# Patient Record
Sex: FEMALE | Race: WHITE | Employment: OTHER | URBAN - METROPOLITAN AREA
[De-identification: names, ages, dates, MRNs, and addresses within clinical notes are randomized per-mention and may not be internally consistent; named-entity substitution may affect disease eponyms.]

---

## 2017-06-28 ENCOUNTER — HOSPITAL ENCOUNTER (OUTPATIENT)
Dept: PHYSICAL THERAPY | Age: 67
Discharge: HOME | End: 2017-06-28
Payer: MEDICARE

## 2017-06-28 PROCEDURE — 97110 THERAPEUTIC EXERCISES: CPT

## 2017-06-28 PROCEDURE — G8982 BODY POS GOAL STATUS: HCPCS

## 2017-06-28 PROCEDURE — 97162 PT EVAL MOD COMPLEX 30 MIN: CPT

## 2017-06-28 PROCEDURE — G8981 BODY POS CURRENT STATUS: HCPCS

## 2017-06-28 NOTE — PROGRESS NOTES
[address] PHYSICAL THERAPY    [address] [place] [address], Severa Hoard [address],[facility], [address] [facility]       Phone: [phone]  Fax: [phone] / [phone] [facility]  Patient Name: Benita Freitas : 1950   Medical   Diagnosis: R Knee pain Treatment Diagnosis: Right knee pain [M25.561]   Onset Date: 1 month ago     Referral Source: Dion Bond MD [place] [facility]): 2017   Prior Hospitalization: See medical history Provider #: [id number]   Prior Level of Function: No difficulty with walking, standing, kneeling, squatting   Comorbidities: Heart Disease, HTN, Type II Diabetes, Visually impaired, Hearing impaired, Asthma   Medications: Verified on Patient Summary List   The Plan of Care and following information is based on the information from the initial evaluation.   ===========================================================================================  Assessment / key information:  Patient is a 75year old female presenting to therapy with signs and symptoms consistent with R knee pain. Patient reports her symptoms began about 1 month ago and is unable to recall a specific event which may have brought this on. Patient did have an x-ray which showed degenerative changes in her knee. Patient reports increased difficulty with squatting, kneeling, walking and standing activities. Patient notes symptoms feel better with rest. Patient rates pain at worst 10/10 and 5/10 at best.   Objective Data: Inspection-Patient ambulates with decreased stance time on the R. Knee AROM: R 0-110, L 3-0-135. Strength Testing R knee flex 4/5 (P!), ext 4/5 (P!); R hip abd and ext 3+/5. L knee strength 5/5. Tenderness noted to R medial joint line, R medial and lateral gastroc head, R medial HS. FOTO: 39/100. Patient educated on diagnosis, prognosis, POC and HEP. Patient issued copy of HEP and denied additional questions.  Patient will benefit from skilled PT in order to address these impairments and functional limitations.   ===========================================================================================  Eval Complexity: History HIGH Complexity :3+ comorbidities / personal factors will impact the outcome/ POC ;  Examination  HIGH Complexity : 4+ Standardized tests and measures addressing body structure, function, activity limitation and / or participation in recreation ; Presentation MEDIUM Complexity : Evolving with changing characteristics ; Decision Making MEDIUM Complexity : FOTO score of 26-74; Overall Complexity MEDIUM  Problem List: pain affecting function, decrease ROM, decrease strength, impaired gait/ balance, decrease ADL/ functional abilitiies, decrease activity tolerance, decrease flexibility/ joint mobility and decrease transfer abilities   Treatment Plan may include any combination of the following: Therapeutic exercise, Therapeutic activities, Neuromuscular re-education, Physical agent/modality, Gait/balance training, Manual therapy, Patient education and Functional mobility training  Patient / Family readiness to learn indicated by: asking questions, trying to perform skills and interest  Persons(s) to be included in education: patient (P)  Barriers to Learning/Limitations: no  Measures taken:    Patient Goal (s): Reduce pain, improve walking ability, return to kneeling   Patient self reported health status: excellent  Rehabilitation Potential: good   Short Term Goals: To be accomplished in  3  weeks:  1. Patient will demonstrate independence with HEP for self management of symptoms. 2. Patient will report reduction in pain at worst to 4-5/10 in order to improve standing tolerance.  Long Term Goals: To be accomplished in  6  weeks:  1. Patient will improve FOTO to >/= 55/100 in order to improve quality of life. 2. Patient will improve R knee AROM to equal that of the L in order to improve walking tolerance.   3. Patient will report a reduction in pain at worst to 2-3/10 in order to improve tolerance to kneeling. Frequency / Duration:   Patient to be seen  2  times per week for 6  weeks:  Patient / Caregiver education and instruction: self care, activity modification and exercises  G-Codes (GP): [id number] Current  CL= 60-79%   Goal  CK= 40-59%. The severity rating is based on the FOTO Score  Therapist Signature: Disha Berkowitz PT Date: 7/08/0234   Certification Period: 6/28/17-9/21/17 Time: 11:35 AM   ===========================================================================================  I certify that the above Physical Therapy Services are being furnished while the patient is under my care. I agree with the treatment plan and certify that this therapy is necessary. Physician Signature:        Date:       Time:     Please sign and return to [facility] or you may fax the signed copy to [phone]. Thank you.

## 2017-06-28 NOTE — PROGRESS NOTES
PHYSICAL THERAPY - DAILY TREATMENT NOTE    Patient Name: Peace Farfan        Date: 2017  : 1950   YES Patient  Verified  Visit #:     Insurance: Payor: Janett Brandon / Plan: [place] MED[place] & [place] / Product Type: Medicare /      In time: 1100 Out time: 1130   Total Treatment Time: 30     Medicare Time Tracking (below)   Total Timed Codes (min):  10 1:1 Treatment Time:  10     TREATMENT AREA =  Right knee pain [M25.561]    SUBJECTIVE  Pain Level (on 0 to 10 scale):  5  / 10   Medication Changes/New allergies or changes in medical history, any new surgeries or procedures? NO    If yes, update Summary List   Subjective Functional Status/Changes:  []  No changes reported     See POC          OBJECTIVE      10 min Therapeutic Exercise:  [x]  See flow sheet   Rationale:      increase ROM and increase strength to improve the patients ability to perform walking activities. min Patient Education:  YES  Reviewed HEP   []  Progressed/Changed HEP based on: Other Objective/Functional Measures:    See POC     Post Treatment Pain Level (on 0 to 10) scale:   5  / 10     ASSESSMENT  Assessment/Changes in Function:     See POC     []  See Progress Note/Recertification   Patient will continue to benefit from skilled PT services to modify and progress therapeutic interventions, address functional mobility deficits, address ROM deficits, address strength deficits, analyze and address soft tissue restrictions, analyze and cue movement patterns, analyze and modify body mechanics/ergonomics, assess and modify postural abnormalities and address imbalance/dizziness to attain remaining goals.    Progress toward goals / Updated goals:    See POC     PLAN  [x]  Upgrade activities as tolerated YES Continue plan of care   []  Discharge due to :    []  Other:      Therapist: Iliana Boswell PT    Date: 2017 Time: 11:44 AM     Future Appointments  Date Time Provider Loyda Calero   7/3/2017 1:00 PM Perla Mathis, PTA [facility]   7/6/2017 9:30 AM Perla Mathis, PTA [facility]   7/13/2017 11:30 AM Disha Gravel, PT [facility]   7/14/2017 2:30 PM Disha Gravbriana, PT [facility]   7/17/2017 10:30 AM Disha Gravbriana, PT [facility]   7/20/2017 11:00 AM Disha Gravel, PT [facility]   7/24/2017 10:00 AM Disha Gravel, PT [facility]   7/26/2017 1:00 PM Disha Gravbriana, PT [facility]

## 2017-07-03 ENCOUNTER — HOSPITAL ENCOUNTER (OUTPATIENT)
Dept: PHYSICAL THERAPY | Age: 67
Discharge: HOME | End: 2017-07-03
Payer: MEDICARE

## 2017-07-03 PROCEDURE — 97140 MANUAL THERAPY 1/> REGIONS: CPT

## 2017-07-03 PROCEDURE — 97110 THERAPEUTIC EXERCISES: CPT

## 2017-07-03 NOTE — PROGRESS NOTES
PHYSICAL THERAPY - DAILY TREATMENT NOTE    Patient Name: Baldo Slider        Date: 7/3/2017  : 1950   YES Patient  Verified  Visit #:     Insurance: Payor: Jonh Padron / Plan: [place] MEDICARE PART A & B / Product Type: Medicare /      In time: 6638 Out time: 130   Total Treatment Time: 39     Medicare Time Tracking (below)   Total Timed Codes (min):  45 1:1 Treatment Time:  45     TREATMENT AREA =  Right knee pain [M25.561]    SUBJECTIVE  Pain Level (on 0 to 10 scale):  2-3  / 10   Medication Changes/New allergies or changes in medical history, any new surgeries or procedures? NO    If yes, update Summary List   Subjective Functional Status/Changes:  []  No changes reported     I have been doing my exercises that Barb Davis gave me. Reports no significant problems over the weekend. OBJECTIVE    35 min Therapeutic Exercise:  [x]  See flow sheet   Rationale:      increase ROM and increase strength to improve the patients ability to perform functional mobility activities with decrease c/o symptoms. 10 min Manual Therapy: Patella mobs, PROM, DTM VMO, gastroc, HS. Rationale:      decrease pain, increase ROM and increase tissue extensibility to improve patient's ability to perform functional mobility activities with decrease c/o symptoms. min Patient Education:  YES  Reviewed HEP   []  Progressed/Changed HEP based on: Other Objective/Functional Measures:    No change in functional measurements today.      Post Treatment Pain Level (on 0 to 10) scale:   2  / 10     ASSESSMENT  Assessment/Changes in Function:     Overall good tolerance to all therapeutic interventions for first treatment session     []  See Progress Note/Recertification   Patient will continue to benefit from skilled PT services to modify and progress therapeutic interventions, address functional mobility deficits, address ROM deficits, address strength deficits, analyze and address soft tissue restrictions and analyze and cue movement patterns to attain remaining goals. Progress toward goals / Updated goals:    No change toward goals today.      PLAN  []  Upgrade activities as tolerated YES Continue plan of care   []  Discharge due to :    []  Other:      Therapist: GARY Bass    Date: 7/3/2017 Time: 7:02 AM       Future Appointments  Date Time Provider Loyda Calero   7/3/2017 1:00 PM Scar Ahr, PTA [facility]   7/6/2017 9:30 AM Scar Ahr, PTA [facility]   7/13/2017 11:30 AM Roman Nunes, PT [facility]   7/14/2017 2:30 PM Roman Nunes, PT [facility]   7/17/2017 10:30 AM Roman Nunes, PT [facility]   7/20/2017 11:00 AM Roman Nunes, PT [facility]   7/24/2017 10:00 AM Roman Nunes, PT [facility]   7/26/2017 1:00 PM Roman Nunes, PT [facility]

## 2017-07-06 ENCOUNTER — HOSPITAL ENCOUNTER (OUTPATIENT)
Dept: PHYSICAL THERAPY | Age: 67
Discharge: HOME | End: 2017-07-06
Payer: MEDICARE

## 2017-07-06 PROCEDURE — 97110 THERAPEUTIC EXERCISES: CPT

## 2017-07-06 PROCEDURE — 97140 MANUAL THERAPY 1/> REGIONS: CPT

## 2017-07-06 NOTE — PROGRESS NOTES
PHYSICAL THERAPY - DAILY TREATMENT NOTE    Patient Name: Naif Nassar        Date: 2017  : 1950   YES Patient  Verified  Visit #:   3   of   12  Insurance: Payor: Renny Frankel / Plan: [place] MED[place] PART A & B / Product Type: Medicare /      In time: 925 Out time: 1010   Total Treatment Time: 39     Medicare Time Tracking (below)   Total Timed Codes (min):  45 1:1 Treatment Time:  45     TREATMENT AREA =  Right knee pain [M25.561]    SUBJECTIVE  Pain Level (on 0 to 10 scale):  1-2  / 10   Medication Changes/New allergies or changes in medical history, any new surgeries or procedures? NO    If yes, update Summary List   Subjective Functional Status/Changes:  []  No changes reported     [place] fine after last session. The knee hurt on the inside of my knee and it stil hurts, but I can raise it better. OBJECTIVE    35 min Therapeutic Exercise:  [x]  See flow sheet   Rationale:      increase ROM and increase strength to improve the patients ability to perform functional mobility activities with decrease c/o symptoms. 10 min Manual Therapy: Patella mobs, PROM, DTM VMO, gastroc, HS. Rationale:      decrease pain, increase ROM and increase tissue extensibility to improve patient's ability to perform functional mobility activities with decrease c/o symptoms. min Patient Education:  YES  Reviewed HEP   []  Progressed/Changed HEP based on: Other Objective/Functional Measures:    increasea knee ROM with PROM and heel slides. Post Treatment Pain Level (on 0 to 10) scale:   0-1  / 10     ASSESSMENT  Assessment/Changes in Function:     Decrease antlagic gait noted while ambulating in gym.      []  See Progress Note/Recertification   Patient will continue to benefit from skilled PT services to modify and progress therapeutic interventions, address functional mobility deficits, address ROM deficits, address strength deficits, analyze and address soft tissue restrictions and analyze and cue movement patterns to attain remaining goals. Progress toward goals / Updated goals:    Progressing steadily with pain reduction and performance of functional moiblity activities.      PLAN  []  Upgrade activities as tolerated YES Continue plan of care   []  Discharge due to :    []  Other:      Therapist: GARY Delgado    Date: 7/6/2017 Time: 6:27 AM       Future Appointments  Date Time Provider Loyda Calero   7/6/2017 9:30 AM Juan Perry, PTA [facility]   7/13/2017 11:30 AM Kady Sylvester, PT [facility]   7/14/2017 2:30 PM Kayd Sylvester, PT [facility]   7/17/2017 10:30 AM Kady Sylvester, PT [facility]   7/20/2017 11:00 AM Kady Sylvester, PT [facility]   7/24/2017 10:00 AM Kady Sylvester, PT [facility]   7/26/2017 1:00 PM Kady Sylvester, PT [facility]

## 2017-07-13 ENCOUNTER — HOSPITAL ENCOUNTER (OUTPATIENT)
Dept: PHYSICAL THERAPY | Age: 67
Discharge: HOME | End: 2017-07-13
Payer: MEDICARE

## 2017-07-13 PROCEDURE — 97140 MANUAL THERAPY 1/> REGIONS: CPT

## 2017-07-13 NOTE — PROGRESS NOTES
PHYSICAL THERAPY - DAILY TREATMENT NOTE    Patient Name: Lorraine Schwartz        Date: 2017  : 1950   YES Patient  Verified  Visit #:     Insurance: Payor: Lali Marshall / Plan: [place] MEDICARE PART A & B / Product Type: Medicare /      In time: 1483 Out time: 2980   Total Treatment Time: 40     Medicare Time Tracking (below)   Total Timed Codes (min):  40 1:1 Treatment Time:  10     TREATMENT AREA =  Right knee pain [M25.561]    SUBJECTIVE  Pain Level (on 0 to 10 scale):    / 10   Medication Changes/New allergies or changes in medical history, any new surgeries or procedures? NO    If yes, update Summary List   Subjective Functional Status/Changes:  []  No changes reported     Patient states she continues to feel better overall and notices she can walk longer distances before the onset of pain. OBJECTIVE    30 min Therapeutic Exercise:  [x]  See flow sheet   Rationale:      increase ROM, increase strength, improve coordination and improve balance to improve the patients ability to perform walking activities. 10 min Manual Therapy: STM to R VMO, R distal adductors, R medial HS; R HS stretch; R knee flexion PROM   Rationale:      decrease pain, increase ROM, increase tissue extensibility and decrease trigger points to improve patient's ability to perform standing activities. min Patient Education:  YES  Reviewed HEP   []  Progressed/Changed HEP based on: Other Objective/Functional Measures:    1:1 TE 0'  Patient presenting with tenderness to R VMO and distal adductors during MT  Tolerates TE program well, minimal to no pain noted with exercise and proper form demonstrated. Post Treatment Pain Level (on 0 to 10) scale:   0  / 10     ASSESSMENT  Assessment/Changes in Function:     Patient reported improved symptoms post session. Patient progressing well with her program at this time.       []  See Progress Note/Recertification   Patient will continue to benefit from skilled PT services to modify and progress therapeutic interventions, address functional mobility deficits, address ROM deficits, address strength deficits, analyze and address soft tissue restrictions, analyze and cue movement patterns, analyze and modify body mechanics/ergonomics and assess and modify postural abnormalities to attain remaining goals.    Progress toward goals / Updated goals:    Met STG#2     PLAN  [x]  Upgrade activities as tolerated YES Continue plan of care   []  Discharge due to :    []  Other:      Therapist: Priti Velazco PT    Date: 7/13/2017 Time: 12:49 PM     Future Appointments  Date Time Provider Loyda Calero   7/14/2017 2:30 PM Priti Velazco PT [facility]   7/20/2017 11:00 AM Priti Velazco PT [facility]   7/21/2017 10:00 AM Priti Velazco PT [facility]   7/24/2017 10:00 AM Priti Velazco PT [facility]   7/26/2017 1:00 PM Priti Velazco, PT [facility]

## 2017-07-14 ENCOUNTER — HOSPITAL ENCOUNTER (OUTPATIENT)
Dept: PHYSICAL THERAPY | Age: 67
End: 2017-07-14
Payer: MEDICARE

## 2017-07-17 ENCOUNTER — HOSPITAL ENCOUNTER (OUTPATIENT)
Dept: PHYSICAL THERAPY | Age: 67
End: 2017-07-17
Payer: MEDICARE

## 2017-07-20 ENCOUNTER — HOSPITAL ENCOUNTER (OUTPATIENT)
Dept: PHYSICAL THERAPY | Age: 67
Discharge: HOME | End: 2017-07-20
Payer: MEDICARE

## 2017-07-20 PROCEDURE — 97110 THERAPEUTIC EXERCISES: CPT

## 2017-07-20 PROCEDURE — 97140 MANUAL THERAPY 1/> REGIONS: CPT

## 2017-07-20 NOTE — PROGRESS NOTES
PHYSICAL THERAPY - DAILY TREATMENT NOTE    Patient Name: Vinod Chin        Date: 2017  : 1950   YES Patient  Verified  Visit #:     Insurance: Payor: Ton Greenberg / Plan: [place] MEDICARE PART A & B / Product Type: Medicare /      In time: 8509 Out time: 1130   Total Treatment Time: 38     Medicare Time Tracking (below)   Total Timed Codes (min):  38 1:1 Treatment Time:  25     TREATMENT AREA =  Right knee pain [M25.561]    SUBJECTIVE  Pain Level (on 0 to 10 scale):  0  / 10   Medication Changes/New allergies or changes in medical history, any new surgeries or procedures? NO    If yes, update Summary List   Subjective Functional Status/Changes:  []  No changes reported     Patient states she has been feeling very well lately and hasn't been experiencing much pain or discomfort. Patient reports continued compliance with her HEP. OBJECTIVE    28 min Therapeutic Exercise:  [x]  See flow sheet   Rationale:      increase ROM, increase strength, improve coordination and improve balance to improve the patients ability to perform walking activities. 10 min Manual Therapy: STM to R distal adductors, R VMO, R medial HS; R knee flexion PROM   Rationale:      decrease pain, increase ROM, increase tissue extensibility and decrease trigger points to improve patient's ability to perform standing activities. min Patient Education:  YES  Reviewed HEP   []  Progressed/Changed HEP based on: Other Objective/Functional Measures:    1:1 TE 15'  Tenderness noted to R VMO and distal adductors during MT  Excellent tolerance to TE program, minimal cuing required for form or technique. Post Treatment Pain Level (on 0 to 10) scale:   0  / 10     ASSESSMENT  Assessment/Changes in Function:     Patient tolerated treatment session well. Plan to progress program at [facility] Shan Cummings 144 for continued improvements in ADL function.       []  See Progress Note/Recertification   Patient will continue to benefit from skilled PT services to modify and progress therapeutic interventions, address functional mobility deficits, address ROM deficits, address strength deficits, analyze and address soft tissue restrictions, analyze and cue movement patterns, analyze and modify body mechanics/ergonomics and assess and modify postural abnormalities to attain remaining goals.    Progress toward goals / Updated goals:    Progressing well with LTG#3     PLAN  [x]  Upgrade activities as tolerated YES Continue plan of care   []  Discharge due to :    []  Other:      Therapist: Disha Berkowitz PT    Date: 7/20/2017 Time: 11:50 AM     Future Appointments  Date Time Provider Loyda Calero   7/24/2017 10:00 AM Disha Berkowitz PT [facility]   7/26/2017 1:00 PM Disha Berkowitz PT [facility]   7/31/2017 1:00 PM Disha Berkowitz PT [facility]   8/2/2017 1:00 PM Disha Berkowitz, PT [facility]

## 2017-07-21 ENCOUNTER — APPOINTMENT (OUTPATIENT)
Dept: PHYSICAL THERAPY | Age: 67
End: 2017-07-21
Payer: MEDICARE

## 2017-07-24 ENCOUNTER — HOSPITAL ENCOUNTER (OUTPATIENT)
Dept: PHYSICAL THERAPY | Age: 67
Discharge: HOME | End: 2017-07-24
Payer: MEDICARE

## 2017-07-24 PROCEDURE — 97140 MANUAL THERAPY 1/> REGIONS: CPT

## 2017-07-24 PROCEDURE — 97110 THERAPEUTIC EXERCISES: CPT

## 2017-07-24 NOTE — PROGRESS NOTES
PHYSICAL THERAPY - DAILY TREATMENT NOTE    Patient Name: Luma Palm        Date: 2017  : 1950   YES Patient  Verified  Visit #:     Insurance: Payor: Saroj Fraction / Plan: [place] MEDICARE PART A & B / Product Type: Medicare /      In time: 552 Out time: 1564   Total Treatment Time: 43     Medicare Time Tracking (below)   Total Timed Codes (min):  43 1:1 Treatment Time:  25     TREATMENT AREA =  Right knee pain [M25.561]    SUBJECTIVE  Pain Level (on 0 to 10 scale):  .5  / 10   Medication Changes/New allergies or changes in medical history, any new surgeries or procedures? NO    If yes, update Summary List   Subjective Functional Status/Changes:  []  No changes reported     Patient reports the inside of her knee feels more sore today which she related to going to garage sales over the weekend. Patient had to get into and out of her sisters car which was low to the ground. OBJECTIVE      33 min Therapeutic Exercise:  [x]  See flow sheet   Rationale:      increase ROM, increase strength, improve coordination and improve balance to improve the patients ability to perform walking activities. 10 min Manual Therapy: STM to R distal adductors, R medial HS, R VMO; R HS stretch, R knee flexion PROM   Rationale:      decrease pain, increase ROM, increase tissue extensibility and decrease trigger points to improve patient's ability to perform standing activities. min Patient Education:  YES  Reviewed HEP   []  Progressed/Changed HEP based on: Other Objective/Functional Measures:    1:1 TE 15'  FOTO 47/100  Patient with continued tenderness to distal adductors, good tolerance to HS stretch and knee flexion PROM  Added seated HS curl with GTB during today's session. Progressed fwd and lat step ups to 6 inch block     Post Treatment Pain Level (on 0 to 10) scale:   0  / 10     ASSESSMENT  Assessment/Changes in Function:     Patient tolerated progression of program well. Patient R knee AROM and strength is gradually improving which has contributed to improved ADL function as evident by improved FOTO score. []  See Progress Note/Recertification   Patient will continue to benefit from skilled PT services to modify and progress therapeutic interventions, address functional mobility deficits, address ROM deficits, address strength deficits, analyze and address soft tissue restrictions, analyze and cue movement patterns, analyze and modify body mechanics/ergonomics, assess and modify postural abnormalities and address imbalance/dizziness to attain remaining goals.    Progress toward goals / Updated goals:    Progressing toward LTG#1     PLAN  [x]  Upgrade activities as tolerated YES Continue plan of care   []  Discharge due to :    []  Other:      Therapist: Cal Figueroa PT    Date: 7/24/2017 Time: 12:28 PM     Future Appointments  Date Time Provider Loyda Calero   7/26/2017 1:00 PM Cal Figueroa PT [facility]   [facility]   8/2/2017 1:00 PM Cal Figueroa PT [facility]

## 2017-07-26 ENCOUNTER — APPOINTMENT (OUTPATIENT)
Dept: PHYSICAL THERAPY | Age: 67
End: 2017-07-26
Payer: MEDICARE

## 2017-07-31 ENCOUNTER — APPOINTMENT (OUTPATIENT)
Dept: PHYSICAL THERAPY | Age: 67
End: 2017-07-31
Payer: MEDICARE

## 2017-08-02 ENCOUNTER — APPOINTMENT (OUTPATIENT)
Dept: PHYSICAL THERAPY | Age: 67
End: 2017-08-02
Payer: MEDICARE

## 2017-08-14 ENCOUNTER — APPOINTMENT (OUTPATIENT)
Dept: PHYSICAL THERAPY | Age: 67
End: 2017-08-14
Payer: MEDICARE

## 2017-08-16 ENCOUNTER — HOSPITAL ENCOUNTER (OUTPATIENT)
Dept: PHYSICAL THERAPY | Age: 67
Discharge: HOME | End: 2017-08-16
Payer: MEDICARE

## 2017-08-16 PROCEDURE — G8979 MOBILITY GOAL STATUS: HCPCS

## 2017-08-16 PROCEDURE — 97140 MANUAL THERAPY 1/> REGIONS: CPT

## 2017-08-16 PROCEDURE — G8978 MOBILITY CURRENT STATUS: HCPCS

## 2017-08-16 NOTE — PROGRESS NOTES
PHYSICAL THERAPY - DAILY TREATMENT NOTE    Patient Name: Hanane Anna        Date: 2017  : 1950   YES Patient  Verified  Visit #:   7   of   12(+6)  Insurance: Payor: [place] [employer] / Plan: [place] MEDICARE PART A & B / Product Type: Medicare /      In time: 223 Out time: 840   Total Treatment Time: 55     Medicare Time Tracking (below)   Total Timed Codes (min):  46 1:1 Treatment Time:  15     TREATMENT AREA =  Right knee pain [M25.561]    SUBJECTIVE  Pain Level (on 0 to 10 scale):  0  / 10   Medication Changes/New allergies or changes in medical history, any new surgeries or procedures? NO    If yes, update Summary List   Subjective Functional Status/Changes:  []  No changes reported     Patient reports a 75% improvement in symptoms since the start of PT. Patient reports her pain at worst as a 5/10 and on average . 5/10. OBJECTIVE    36 min Therapeutic Exercise:  [x]  See flow sheet   Rationale:      increase ROM and increase strength to improve the patients ability to perform walking activities. 10 min Manual Therapy: STM to R VMO and distal adductors; R knee flexion PROM   Rationale:      decrease pain, increase ROM, increase tissue extensibility and decrease trigger points to improve patient's ability to perform standing activities. min Patient Education:  YES  Reviewed HEP   []  Progressed/Changed HEP based on:        Other Objective/Functional Measures:    1:1 TE 5'(NB)  See PN     Post Treatment Pain Level (on 0 to 10) scale:   0  / 10     ASSESSMENT  Assessment/Changes in Function:     See PN     []  See Progress Note/Recertification   Patient will continue to benefit from skilled PT services to modify and progress therapeutic interventions, address functional mobility deficits, address ROM deficits, address strength deficits, analyze and address soft tissue restrictions, analyze and cue movement patterns, analyze and modify body mechanics/ergonomics, assess and modify postural abnormalities and address imbalance/dizziness to attain remaining goals.    Progress toward goals / Updated goals:    See PN     PLAN  [x]  Upgrade activities as tolerated YES Continue plan of care   []  Discharge due to :    []  Other:      Therapist: Michael Madrid PT    Date: 8/16/2017 Time: 12:47 PM     Future Appointments  Date Time Provider Loyda Calero   8/22/2017 4:00 PM Michael Madrid PT [facility]   8/24/2017 4:00 PM Michael Madrid PT [facility]   8/29/2017 3:00 PM Michael Madrid PT [facility]   9/5/2017 9:30 AM Michael Madrid PT [facility]

## 2017-08-16 NOTE — PROGRESS NOTES
[address] PHYSICAL THERAPY  [address] [place] [address], [address],Mihaela Foley, 70 [facility] - Phone: [phone]  Fax: [phone]  CONTINUED PLAN OF CARE/RECERTIFICATION FOR PHYSICAL THERAPY          Patient Name: Adrien Delatorre : 1950   Treatment/Medical Diagnosis: Right knee pain [M25.561]   Onset Date: May 2017    Referral Source: Marlene Velasco MD Start of Care [facility]): 17   Prior Hospitalization: See Medical History Provider #: [id number]   Prior Level of Function: No difficulty with walking, standing, kneeling, squatting   Comorbidities: Heart Disease, HTN, Type II Diabetes, Visually impaired, Hearing impaired, Asthma   Medications: Verified on Patient Summary List   Visits from [facility]: 7 Missed Visits: 3     Goal/Measure of Progress Goal Met? 1. Patient will improve FOTO to >/= 55/100 in order to improve quality of life   Status at last Eval: 39/100 Current Status: 47/100 progressing   2. Patient will improve R knee AROM to equal that of the L in order to improve walking tolerance   Status at last Eval:  Knee AROM: R 0-110 Current Status: R knee AROM: 0-125 progressing   3. Patient will report a reduction in pain at worst to 2-3/10 in order to improve tolerance to kneeling   Status at last Eval: 10/10 Current Status: 5/10 progressing     Key Functional Changes/Progress: Patient reports a 75% improvement in symptoms since the start of therapy. Patient states she had been progressing well with her program, however recently was in [place] for 2 weeks and experienced increased pain with prolonged walking activities. Patient states when she isn't performing excessive activities such as this, she does well. Patient rates her pain at worst as a 5/10 and on average of (1/2)/10. Patient's R knee strength has improved to 5/5 for ext with no pain with resistance as well as 4+/5 for flex, however does note pain in this direction.  Patient would benefit from continued PT in order to further progress program and address remaining impairments and functional limitations. Problem List: pain affecting function, decrease ROM, decrease strength, impaired gait/ balance, decrease ADL/ functional abilitiies, decrease activity tolerance and decrease flexibility/ joint mobility   Treatment Plan may include any combination of the following: Therapeutic exercise, Therapeutic activities, Neuromuscular re-education, Physical agent/modality, Manual therapy, Patient education, Functional mobility training and Stair training  Patient Goal(s) has been updated and includes:      Goals for this certification period include and are to be achieved in   3  weeks:  1. Continue with LTG#1  2. Continue with LTG#2  3. Continue with LTG#3  Frequency / Duration:   Patient to be seen   2   times per week for   3    weeks:  G-Codes (GP): Mobility [id number] Current  CK= 40-59%   Goal  CK= 40-59%. The severity rating is based on the FOTO Score  Assessments/Recommendations: Patient would benefit from continued PT 2x/week for 3 weeks in order to progress program and address remaining impairments and functional limitations. If you have any questions/comments please contact us directly at [phone]. Thank you for allowing us to assist in the care of your patient. Therapist Signature:  Asad Coelho, PT Date: 6/26/5700   Certification Period:  Reporting Period: 6/28/17-9/21/17 6/28/17-8/16/17 Time: 12:38 PM   NOTE TO PHYSICIAN:  PLEASE COMPLETE THE ORDERS BELOW AND FAX TO   [facility]: [phone]  If you are unable to process this request in 24 hours please contact our office: [phone]    ___ I have read the above report and request that my patient continue as recommended.   ___ I have read the above report and request that my patient continue therapy with the following changes/special instructions: ________________________________________________   ___ I have read the above report and request that my patient be discharged from therapy.      Physician Signature:        Date:       Time:

## 2017-08-22 ENCOUNTER — APPOINTMENT (OUTPATIENT)
Dept: PHYSICAL THERAPY | Age: 67
End: 2017-08-22
Payer: MEDICARE

## 2017-08-24 ENCOUNTER — APPOINTMENT (OUTPATIENT)
Dept: PHYSICAL THERAPY | Age: 67
End: 2017-08-24
Payer: MEDICARE

## 2017-08-29 ENCOUNTER — APPOINTMENT (OUTPATIENT)
Dept: PHYSICAL THERAPY | Age: 67
End: 2017-08-29
Payer: MEDICARE

## 2017-09-05 ENCOUNTER — APPOINTMENT (OUTPATIENT)
Dept: PHYSICAL THERAPY | Age: 67
End: 2017-09-05

## 2017-09-25 NOTE — PROGRESS NOTES
[address] PHYSICAL THERAPY  [address] [place] [address] 51, [place] [address],[facility], 70 [facility] - Phone: [phone]  Fax: [phone]          Patient Name: Jesus Mcdonald : 1950   Treatment/Medical Diagnosis: Right knee pain [M25.561]   Onset Date: May 2017    Referral Source: Madina Armenta MD [facility]): 17   Prior Hospitalization: See Medical History Provider #: [id number]   Prior Level of Function: No difficulty with walking, standing, kneeling, squatting   Comorbidities: Heart Disease, HTN, Type II Diabetes, Visually impaired, Hearing impaired, Asthma   Medications: Verified on Patient Summary List   Visits from [facility]: 7 Missed Visits: 4     Goal/Measure of Progress Goal Met? 1. Patient will improve FOTO to >/= 55/100 in order to improve quality of life   Status at last Eval: 47/100 Current Status: 47/100 no   2. Patient will improve R knee AROM to equal that of the L in order to improve walking tolerance   Status at last Eval: R knee AROM: 0-125 Current Status: R knee AROM: 0-125 no   3. Patient will report a reduction in pain at worst to 2-3/10 in order to improve tolerance to kneeling   Status at last Eval: 5/10 Current Status: 5/10 no     Key Functional Changes/Progress: Patient has not attended a follow up appointment since her last progress note on 17. Based upon this, patient has not reached her goals since this time. Patient had reported several family complications which limited her availability for PT and is the likely reason for her lack of attendance over this time. Patient will be discharged from PT, thank you for this referral.     G-Codes (GP): Mobility Mobility   Goal  CK= 40-59%  D/C  CK= 40-59%. The severity rating is based on the FOTO Score. Assessments/Recommendations: Discontinue therapy due to lack of attendance or compliance.     If you have any questions/comments please contact us directly at [phone]. Thank you for allowing us to assist in the care of your patient. Therapist Signature:  Lilian Pradhan PT Date: 9/25/17   Reporting Period: 8/16/17-9/25/17 Time: 9:21 AM